# Patient Record
(demographics unavailable — no encounter records)

---

## 2024-10-24 NOTE — DISCUSSION/SUMMARY
[FreeTextEntry1] : 1) Pneumonia- suspect MYCOPLASMA- will send full viral panel with mycoplasma Zithromax sent Looks well hydrated If he is not drinking, is lethargic or respiratory distress- go to the er Persistent cough- will also give prednisolone daily for 5 days 2) Pharyngitis- Strep (rapid) and throat culture. Spent time discussing the concern for pneumonia. If fever is not gone in 1 to 2 days return to be seen in office or [facility] or ER if office is closed.

## 2024-10-24 NOTE — REVIEW OF SYSTEMS
[Negative] : Genitourinary [Ear Pain] : no ear pain [Nasal Discharge] : no nasal discharge [Nasal Congestion] : no nasal congestion [Sore Throat] : no sore throat [Seizure] : no seizures [Abnormal Movements] :  no abnormal movements [Myalgia] : no myalgia [Rash] : no rash

## 2024-10-24 NOTE — HISTORY OF PRESENT ILLNESS
[de-identified] : Cough and shortness of breath [FreeTextEntry6] : Fever  since 4 days ago Tylenol and Motrin No vomiting No diarrhea No chest pain

## 2024-11-05 NOTE — DISCUSSION/SUMMARY
[FreeTextEntry1] : Vaccine Information Sheet(s) given for appropriate vaccines. The components of the vaccine(s) to be administered today are listed in the plan of care. We discussed common side effects and education on the vaccine was provided including the disease(s) for which the vaccine(s) are intended to prevent as well as any risks. Denies any questions. Consent was given to vaccinate. Quadracel given by LPN [] : The components of the vaccine(s) to be administered today are listed in the plan of care. The disease(s) for which the vaccine(s) are intended to prevent and the risks have been discussed with the caretaker.  The risks are also included in the appropriate vaccination information statements which have been provided to the patient's caregiver.  The caregiver has given consent to vaccinate.

## 2024-12-21 NOTE — PHYSICAL EXAM

## 2024-12-21 NOTE — HISTORY OF PRESENT ILLNESS
[Parents] : parents [Fruit] : fruit [Vegetables] : vegetables [Meat] : meat [Grains] : grains [Eggs] : eggs [Dairy] : dairy [Toilet Trained] :  toilet trained [Normal] : Normal [In own bed] : In own bed [Brushing teeth] : Brushing teeth [Vitamin] : Primary Fluoride Source: Vitamin [Playtime (60 min/d)] : Playtime 60 min a day [< 2 hrs of screen time] : Less than 2 hrs of screen time [Appropiate parent-child-sibling interaction] : Appropriate parent-child-sibling interaction [Child Cooperates] : Child cooperates [Parent has appropriate responses to behavior] : Parent has appropriate responses to behavior [In ] : In  [No difficulties with Homework] : No difficulties with homework  [No] : Not at  exposure [Water heater temperature set at <120 degrees F] : Water heater temperature set at <120 degrees F [Car seat in back seat] : Car seat in back seat [Carbon Monoxide Detectors] : Carbon monoxide detectors [Smoke Detectors] : Smoke detectors [Supervised outdoor play] : Supervised outdoor play [FreeTextEntry9] : swimming ghislaine ball/ legos/ pretend play/ tae twyla do/

## 2024-12-21 NOTE — DISCUSSION/SUMMARY
[Normal Growth] : growth [Normal Development] : development  [No Elimination Concerns] : elimination [Continue Regimen] : feeding [No Skin Concerns] : skin [Normal Sleep Pattern] : sleep [None] : no medical problems [Anticipatory Guidance Given] : Anticipatory guidance addressed as per the history of present illness section [No Vaccines] : no vaccines needed [No Medications] : ~He/She~ is not on any medications [FreeTextEntry1] : 5 year male here for well-visit, appropriate growth and development observed. Continue balanced diet with all food groups. Brush teeth twice a day with toothbrush. Recommend visit to dentist. As per car seat 's guidelines, use foward-facing booster seat until child reaches highest weight/height for seat. Child needs to ride in a belt-positioning booster seat until  4 feet 9 inches has been reached and are between 8 and 12 years of age. Put child to sleep in own bed. Help child to maintain consistent daily routines and sleep schedule.  discussed. Ensure home is safe. Teach child about personal safety. Use consistent, positive discipline. Read aloud to child. Limit screen time to no more than 2 hours per day. Return 1 year for routine well child check.

## 2025-01-21 NOTE — REVIEW OF SYSTEMS
[Fever] : fever [Nasal Discharge] : nasal discharge [Nasal Congestion] : nasal congestion [Wheezing] : wheezing [Cough] : cough [Negative] : Skin

## 2025-01-24 NOTE — HISTORY OF PRESENT ILLNESS
[FreeTextEntry6] : Osiris is a 5 yr old with fever for the last 3 days . Temp up to 101.5 , Her has a cough and runny nose as well .

## 2025-03-12 NOTE — HISTORY OF PRESENT ILLNESS
[EENT/Resp Symptoms] : EENT/RESPIRATORY SYMPTOMS [Nasal congestion] : nasal congestion [___ Day(s)] : [unfilled] day(s) [Active] : active [Decreased appetite] : decreased appetite [Sick Contacts: ___] : sick contacts: [unfilled] [Clear rhinorrhea] : clear rhinorrhea [Wet cough] : wet cough [Sore Throat] : sore throat [Cough] : cough [Decreased Appetite] : decreased appetite [Known Exposure to COVID-19] : no known exposure to COVID-19 [Hx of recent COVID-19 infection] : no history of recent COVID-19 infection [Fever] : no fever [Headache] : no headache [Change in sleep] : no change in sleep  [Eye Redness] : no eye redness [Eye Discharge] : no eye discharge [Eye Itching] : no eye itching [Ear Pain] : no ear pain [Wheezing] : no wheezing [Shortness of Breath] : no shortness of breath [Vomiting] : no vomiting [Diarrhea] : no diarrhea [Decreased Urine Output] : no decreased urine output [Rash] : no rash [Loss of taste] : no loss of taste [Loss of smell] : no loss of smell [FreeTextEntry9] : stomach pain

## 2025-03-12 NOTE — DISCUSSION/SUMMARY
[FreeTextEntry1] : Pharyngitis- Rapid strep performed in office. Will send throat culture to rule out strep. Recommend supportive care with antipyretics, salt water gargles, and if age-appropriate throat lozenges. rapid covid and rapid flu done